# Patient Record
Sex: MALE | Race: WHITE | Employment: FULL TIME | URBAN - METROPOLITAN AREA
[De-identification: names, ages, dates, MRNs, and addresses within clinical notes are randomized per-mention and may not be internally consistent; named-entity substitution may affect disease eponyms.]

---

## 2022-01-12 ENCOUNTER — HOSPITAL ENCOUNTER (EMERGENCY)
Age: 32
Discharge: LEFT BEFORE DISCHARGE | End: 2022-01-12
Attending: EMERGENCY MEDICINE

## 2022-01-12 ENCOUNTER — APPOINTMENT (OUTPATIENT)
Dept: GENERAL RADIOLOGY | Age: 32
End: 2022-01-12

## 2022-01-12 VITALS
WEIGHT: 200 LBS | SYSTOLIC BLOOD PRESSURE: 155 MMHG | DIASTOLIC BLOOD PRESSURE: 112 MMHG | BODY MASS INDEX: 27.09 KG/M2 | RESPIRATION RATE: 24 BRPM | HEIGHT: 72 IN | OXYGEN SATURATION: 100 % | HEART RATE: 135 BPM

## 2022-01-12 DIAGNOSIS — T40.601A OPIATE OVERDOSE, ACCIDENTAL OR UNINTENTIONAL, INITIAL ENCOUNTER (HCC): Primary | ICD-10-CM

## 2022-01-12 PROCEDURE — 99283 EMERGENCY DEPT VISIT LOW MDM: CPT

## 2022-01-12 PROCEDURE — 6360000002 HC RX W HCPCS: Performed by: EMERGENCY MEDICINE

## 2022-01-12 PROCEDURE — 71045 X-RAY EXAM CHEST 1 VIEW: CPT

## 2022-01-12 PROCEDURE — 96375 TX/PRO/DX INJ NEW DRUG ADDON: CPT

## 2022-01-12 PROCEDURE — 96374 THER/PROPH/DIAG INJ IV PUSH: CPT

## 2022-01-12 PROCEDURE — 2580000003 HC RX 258: Performed by: EMERGENCY MEDICINE

## 2022-01-12 PROCEDURE — 96361 HYDRATE IV INFUSION ADD-ON: CPT

## 2022-01-12 PROCEDURE — 93005 ELECTROCARDIOGRAM TRACING: CPT | Performed by: EMERGENCY MEDICINE

## 2022-01-12 RX ORDER — 0.9 % SODIUM CHLORIDE 0.9 %
1000 INTRAVENOUS SOLUTION INTRAVENOUS ONCE
Status: COMPLETED | OUTPATIENT
Start: 2022-01-12 | End: 2022-01-12

## 2022-01-12 RX ORDER — PROMETHAZINE HYDROCHLORIDE 25 MG/ML
12.5 INJECTION, SOLUTION INTRAMUSCULAR; INTRAVENOUS ONCE
Status: COMPLETED | OUTPATIENT
Start: 2022-01-12 | End: 2022-01-12

## 2022-01-12 RX ORDER — NALOXONE HYDROCHLORIDE 1 MG/ML
2 INJECTION INTRAMUSCULAR; INTRAVENOUS; SUBCUTANEOUS ONCE
Status: COMPLETED | OUTPATIENT
Start: 2022-01-12 | End: 2022-01-12

## 2022-01-12 RX ORDER — DIPHENHYDRAMINE HYDROCHLORIDE 50 MG/ML
25 INJECTION INTRAMUSCULAR; INTRAVENOUS ONCE
Status: COMPLETED | OUTPATIENT
Start: 2022-01-12 | End: 2022-01-12

## 2022-01-12 RX ADMIN — NALOXONE HYDROCHLORIDE 2 MG: 1 INJECTION PARENTERAL at 15:38

## 2022-01-12 RX ADMIN — SODIUM CHLORIDE 1000 ML: 9 INJECTION, SOLUTION INTRAVENOUS at 16:25

## 2022-01-12 RX ADMIN — DIPHENHYDRAMINE HYDROCHLORIDE 25 MG: 50 INJECTION INTRAMUSCULAR; INTRAVENOUS at 15:54

## 2022-01-12 RX ADMIN — PROMETHAZINE HYDROCHLORIDE 12.5 MG: 25 INJECTION INTRAMUSCULAR; INTRAVENOUS at 15:55

## 2022-01-12 NOTE — ED PROVIDER NOTES
Triage Chief Complaint:   Drug Overdose (snorted percocet 30)    [place]:  Felix Akers is a 33 y.o. male that presents after opiate overdose. Patient was brought in by significant other who reports that he woke up and was complaining of breathing difficulty. Patient is very drowsy on arrival and is intermittently falling asleep which does limit history. Patient is able to tell me that he \"snorted Percocet\" patient's girlfriend does report that he takes \"Percocet 30s\". Patient denies any injection drug use. History is limited as above. The overdose was not intentional.      ROS:  Limited as above. No past medical history on file. No past surgical history on file. No family history on file. Social History     Socioeconomic History    Marital status: Not on file     Spouse name: Not on file    Number of children: Not on file    Years of education: Not on file    Highest education level: Not on file   Occupational History    Not on file   Tobacco Use    Smoking status: Not on file    Smokeless tobacco: Not on file   Substance and Sexual Activity    Alcohol use: Not on file    Drug use: Not on file    Sexual activity: Not on file   Other Topics Concern    Not on file   Social History Narrative    Not on file     Social Determinants of Health     Financial Resource Strain:     Difficulty of Paying Living Expenses: Not on file   Food Insecurity:     Worried About Running Out of Food in the Last Year: Not on file    Sonja of Food in the Last Year: Not on file   Transportation Needs:     Lack of Transportation (Medical): Not on file    Lack of Transportation (Non-Medical):  Not on file   Physical Activity:     Days of Exercise per Week: Not on file    Minutes of Exercise per Session: Not on file   Stress:     Feeling of Stress : Not on file   Social Connections:     Frequency of Communication with Friends and Family: Not on file    Frequency of Social Gatherings with Friends and available lab results from this visit (if applicable):  No results found for this visit on 01/12/22. Radiographs (if obtained):  [] The following radiograph was interpreted by myself in the absence of a radiologist:   [] Radiologist's Report Reviewed:  No orders to display         EKG (if obtained): (All EKG's are interpreted by myself in the absence of a cardiologist)  12 lead EKG per my interpretation:  Sinus Tachycardia at 128  Axis is   Normal  QTc is  within an acceptable range  There is no specific T wave changes appreciated. There is no specific ST wave changes appreciated. No STEMI    Prior EKG to compare with was NOT available. Chart review shows recent radiographs:  No results found. MDM:  Patient here for opiate overdose I did evaluate patient initially in triage room and brought him immediately back to resuscitation room. IV is established and IV Narcan is given with brisk improvement of mental status. Patient did become tachycardic after Narcan administration and IV fluid bolus was given. Phenergan was given for patient subsequent nausea in addition to IV Benadryl for his agitation. EKG also obtained and is with a sinus tachycardia as above. Patient is observed for over an hour and not requiring any further Narcan. Patient on repeated rechecks is requesting discharge home. Patient is adamantly declining that this was an attempt to hurt himself. Patient significant other is able to come get him. I did prescribe patient Narcan for home-going. Clinical Impression:  1. Opiate overdose, accidental or unintentional, initial encounter [facility])      Disposition referral (if applicable):  No follow-up provider specified.   Disposition medications (if applicable):  New Prescriptions    No medications on file       Comment: Please note this report has been produced using speech recognition software and may contain errors related to that system including errors in grammar, punctuation, and spelling, as well as words and phrases that may be inappropriate. If there are any questions or concerns please feel free to contact the dictating provider for clarification.        Kelvin Gould MD  01/13/22 [id number]

## 2022-01-12 NOTE — ED NOTES
The patient ambulated to the bathroom with assistance.                              Adelina Kelly RN  01/12/22 3432

## 2022-01-12 NOTE — ED NOTES
The patient walked in the hallway without any assistance.                     Madeline Garcia RN  01/12/22 9048

## 2022-01-12 NOTE — ED TRIAGE NOTES
Pt arrives to ER with gf states he is having trouble breathing, pt taken to triage and vital signs assessed and pt has pinpoint pupils and is unable to stay awake, pt finally admits to snorting percocet-30 about an hour PTA  Dr David Chan called to triage and pt brought to Trauma 4 IV est and narcan given as ordered

## 2022-01-12 NOTE — ED NOTES
Pt ambulated to bathroom, calling his girlfriend for a ride at this time     Agustina Gay RN  01/12/22 0885

## 2022-01-14 LAB
EKG ATRIAL RATE: 128 BPM
EKG DIAGNOSIS: NORMAL
EKG P AXIS: 51 DEGREES
EKG P-R INTERVAL: 132 MS
EKG Q-T INTERVAL: 308 MS
EKG QRS DURATION: 76 MS
EKG QTC CALCULATION (BAZETT): 449 MS
EKG R AXIS: 21 DEGREES
EKG T AXIS: 25 DEGREES
EKG VENTRICULAR RATE: 128 BPM

## 2022-01-14 PROCEDURE — 93010 ELECTROCARDIOGRAM REPORT: CPT | Performed by: INTERNAL MEDICINE

## 2022-03-16 ENCOUNTER — HOSPITAL ENCOUNTER (EMERGENCY)
Age: 32
Discharge: HOME | End: 2022-03-16
Attending: EMERGENCY MEDICINE

## 2022-03-16 VITALS
HEART RATE: 85 BPM | BODY MASS INDEX: 29.66 KG/M2 | TEMPERATURE: 99 F | DIASTOLIC BLOOD PRESSURE: 98 MMHG | WEIGHT: 219 LBS | SYSTOLIC BLOOD PRESSURE: 134 MMHG | OXYGEN SATURATION: 98 % | HEIGHT: 72 IN | RESPIRATION RATE: 16 BRPM

## 2022-03-16 DIAGNOSIS — Z00.8 ENCOUNTER FOR WORK CAPABILITY ASSESSMENT: ICD-10-CM

## 2022-03-16 DIAGNOSIS — H57.89 EYE IRRITATION: Primary | ICD-10-CM

## 2022-03-16 PROCEDURE — 99283 EMERGENCY DEPT VISIT LOW MDM: CPT

## 2022-03-16 NOTE — ED TRIAGE NOTES
Patient ambulates to triage with complaints of Facial Swelling. Patient states he started having facial swelling last night. Patient states he thinks this may be related to pet allergies. Patient states the swelling is worse today but his work wants him checked out to make sure he doesn't have pink eye before coming to work today.

## 2022-03-16 NOTE — Clinical Note
Baron Moseley was seen and treated in our emergency department on 3/16/2022. He may return to work on 03/16/2022. Patient does not have contagious pinkeye. Cleared to return to work. If you have any questions or concerns, please don't hesitate to call.       Javier Michaels MD

## 2022-03-16 NOTE — ED PROVIDER NOTES
0.50     Types: Cigarettes    Smokeless tobacco: Never Used   Substance and Sexual Activity    Alcohol use: Never    Drug use: Never    Sexual activity: None   Other Topics Concern    None   Social History Narrative    None     Social Determinants of Health     Financial Resource Strain:     Difficulty of Paying Living Expenses: Not on file   Food Insecurity:     Worried About Running Out of Food in the Last Year: Not on file    Sonja of Food in the Last Year: Not on file   Transportation Needs:     Lack of Transportation (Medical): Not on file    Lack of Transportation (Non-Medical): Not on file   Physical Activity:     Days of Exercise per Week: Not on file    Minutes of Exercise per Session: Not on file   Stress:     Feeling of Stress : Not on file   Social Connections:     Frequency of Communication with Friends and Family: Not on file    Frequency of Social Gatherings with Friends and Family: Not on file    Attends Shinto Services: Not on file    Active Member of [address] [place] [address] or Organizations: Not on file    Attends Club or Organization Meetings: Not on file    Marital Status: Not on file   Intimate Partner Violence:     Fear of Current or Ex-Partner: Not on file    Emotionally Abused: Not on file    Physically Abused: Not on file    Sexually Abused: Not on file   Housing Stability:     Unable to Pay for Housing in the Last Year: Not on file    Number of [place] in the Last Year: Not on file    Unstable Housing in the Last Year: Not on file       SURGICAL HISTORY  History reviewed. No pertinent surgical history. CURRENT MEDICATIONS  No current facility-administered medications on file prior to encounter. No current outpatient medications on file prior to encounter.          ALLERGIES  Allergies   Allergen Reactions    Benadryl [Diphenhydramine] Hives       PHYSICAL EXAM  VITAL SIGNS: BP (!) 134/98   Pulse 85   Temp 99 °F (37.2 °C) (Infrared)   Resp 16   Ht 6' (1.829 m) Wt 219 lb (99.3 kg)   SpO2 98%   BMI 29.70 kg/m²   Constitutional: Well developed, Well nourished, resting in bed, active, pleasant  HENT: Normocephalic, Atraumatic, Bilateral external ears normal, Oropharynx moist, No oral exudates, Nose normal.  No perioral or periorbital edema  Eyes: PERRL, EOMI, Conjunctiva normal, No discharge. Pupils 3 mm and reactive  Neck: Normal range of motion, Supple, No stridor. Cardiovascular: Normal heart rate, Normal rhythm, No murmurs, No rubs, No gallops. Thorax & Lungs: Normal breath sounds, No respiratory distress, No wheezing, No chest tenderness. Abdomen: Bowel sounds normal, Soft, No tenderness, no guarding, no rebound, No masses, No pulsatile masses. Skin: Warm, Dry, No erythema, No rash. Extremities: Intact distal pulses, No edema, No tenderness, No cyanosis, No clubbing. Musculoskeletal: Good gross range of motion in all major joints. No major deformities noted. Neurologic: Alert & oriented x 3, Normal gross motor function, Normal gross sensory function, No focal deficits noted. Psychiatric: Affect normal      COURSE & MEDICAL DECISION MAKING  Pertinent Labs & Imaging studies reviewed. (See chart for details)    38-year-old male presents after eye irritation yesterday. Its resolved by time of arrival here, there is no suggestion of conjunctival injection or periorbital edema. There is no signs of allergic reaction. He is well, active, hemodynamically stable without any concerns. He is cleared to return to work, there is no suggestion of bacterial conjunctivitis on clinical exam.  Discharged in stable condition, strict return precautions put into place. Work note provided.     FINAL IMPRESSION  Problem List Items Addressed This Visit     None      Visit Diagnoses     Eye irritation    -  Primary    Encounter for work capability assessment          1.    2.   3.    Patient gave me permission to discuss medical history, care, and plan with those present in the room.   Electronically signed by: Benja Lira MD, 3/16/2022  MD Benja Edward MD  03/16/22 [id number]

## 2022-06-02 ENCOUNTER — HOSPITAL ENCOUNTER (EMERGENCY)
Age: 32
Discharge: HOME | End: 2022-06-02
Attending: STUDENT IN AN ORGANIZED HEALTH CARE EDUCATION/TRAINING PROGRAM

## 2022-06-02 VITALS
HEART RATE: 82 BPM | TEMPERATURE: 97.3 F | SYSTOLIC BLOOD PRESSURE: 137 MMHG | HEIGHT: 72 IN | BODY MASS INDEX: 29.12 KG/M2 | RESPIRATION RATE: 14 BRPM | DIASTOLIC BLOOD PRESSURE: 100 MMHG | OXYGEN SATURATION: 96 % | WEIGHT: 215 LBS

## 2022-06-02 DIAGNOSIS — J01.90 ACUTE NON-RECURRENT SINUSITIS, UNSPECIFIED LOCATION: Primary | ICD-10-CM

## 2022-06-02 DIAGNOSIS — R05.9 COUGH: ICD-10-CM

## 2022-06-02 PROCEDURE — 99283 EMERGENCY DEPT VISIT LOW MDM: CPT

## 2022-06-02 RX ORDER — CETIRIZINE HYDROCHLORIDE 10 MG/1
10 TABLET ORAL DAILY
Qty: 30 TABLET | Refills: 0 | Status: SHIPPED | OUTPATIENT
Start: 2022-06-02 | End: 2022-07-02

## 2022-06-02 RX ORDER — CETIRIZINE HYDROCHLORIDE 10 MG/1
10 TABLET ORAL DAILY
Qty: 30 TABLET | Refills: 0 | Status: SHIPPED | OUTPATIENT
Start: 2022-06-02 | End: 2022-06-02 | Stop reason: SDUPTHER

## 2022-06-02 RX ORDER — FLUTICASONE PROPIONATE 50 MCG
2 SPRAY, SUSPENSION (ML) NASAL DAILY
Qty: 16 G | Refills: 0 | Status: SHIPPED | OUTPATIENT
Start: 2022-06-02

## 2022-06-02 RX ORDER — FLUTICASONE PROPIONATE 50 MCG
2 SPRAY, SUSPENSION (ML) NASAL DAILY
Qty: 16 G | Refills: 0 | Status: SHIPPED | OUTPATIENT
Start: 2022-06-02 | End: 2022-06-02 | Stop reason: SDUPTHER

## 2022-06-02 ASSESSMENT — PAIN - FUNCTIONAL ASSESSMENT: PAIN_FUNCTIONAL_ASSESSMENT: NONE - DENIES PAIN

## 2022-06-02 NOTE — Clinical Note
Hoang Duval was seen and treated in our emergency department on 6/2/2022. He may return to work on 06/03/2022. If you have any questions or concerns, please don't hesitate to call.       Ryder Simms MD

## 2022-06-02 NOTE — ED PROVIDER NOTES
[facility] ENCOUNTER      Pt Name: Rai Veronica  MRN: [id number]  [place] 1990  Date of evaluation: 6/2/2022  Provider: Jamison Baez MD    CHIEF COMPLAINT       Chief Complaint   Patient presents with    Nasal Congestion     1 month. reports concerned for black mold. reports needing a work note today. reports left nostril sore from putting a q tip in it       HISTORY OF PRESENT ILLNESS    Rai Veronica is a 33 y.o. male Presenting with nasal congestion and cough. Symptoms been worsening over the past few days. He was coughing so much yesterday that he had 1 episode of posttussive emesis. He describes concern of having black mold as he had a neighbor that moved out due to concerns of black mold. Not confirm whether there is black mold in his apartment building or not. He states that his symptoms feel better when he walks out of his apartment. He has been taking Mucinex without improvement of his symptoms. No fevers, chills, nausea, vomiting, rash, hemoptysis, leg pain or swelling, chest pain reported. Nursing Notes were reviewed. REVIEW OF SYSTEMS     Review of Systems  A 10 point review of system was performed and is otherwise negative apart from what is noted in HPI and nursing notes. As is my standard practice, all pertinent positives from the ROS are documented in the HPI. PAST MEDICAL HISTORY   History reviewed. No pertinent past medical history. SURGICAL HISTORY     History reviewed. No pertinent surgical history. CURRENT MEDICATIONS       Discharge Medication List as of 6/2/2022  3:31 PM          ALLERGIES     Benadryl [diphenhydramine]    FAMILY HISTORY     History reviewed. No pertinent family history.      SOCIAL HISTORY       Social History     Socioeconomic History    Marital status: Single     Spouse name: None    Number of children: None    Years of education: None    Highest education level: None   Occupational History    None   Tobacco Use    Smoking status: Current Every Day Smoker     Packs/day: 0.50     Types: Cigarettes    Smokeless tobacco: Never Used   Substance and Sexual Activity    Alcohol use: Never    Drug use: Never    Sexual activity: None   Other Topics Concern    None   Social History Narrative    None     Social Determinants of Health     Financial Resource Strain:     Difficulty of Paying Living Expenses: Not on file   Food Insecurity:     Worried About Running Out of Food in the Last Year: Not on file    Sonja of Food in the Last Year: Not on file   Transportation Needs:     Lack of Transportation (Medical): Not on file    Lack of Transportation (Non-Medical): Not on file   Physical Activity:     Days of Exercise per Week: Not on file    Minutes of Exercise per Session: Not on file   Stress:     Feeling of Stress : Not on file   Social Connections:     Frequency of Communication with Friends and Family: Not on file    Frequency of Social Gatherings with Friends and Family: Not on file    Attends Temple Services: Not on file    Active Member of [address] [place] [address] or Organizations: Not on file    Attends Club or Organization Meetings: Not on file    Marital Status: Not on file   Intimate Partner Violence:     Fear of Current or Ex-Partner: Not on file    Emotionally Abused: Not on file    Physically Abused: Not on file    Sexually Abused: Not on file   Housing Stability:     Unable to Pay for Housing in the Last Year: Not on file    Number of [place] in the Last Year: Not on file    Unstable Housing in the Last Year: Not on file       PHYSICAL EXAM       ED Triage Vitals   BP Temp Temp src Heart Rate Resp SpO2 Height Weight   06/02/22 1420 06/02/22 1421 -- 06/02/22 1420 06/02/22 1420 06/02/22 1420 06/02/22 1420 06/02/22 1420   (!) 137/100 97.3 °F (36.3 °C)  82 14 96 % 6' (1.829 m) 215 lb (97.5 kg)         Physical Exam  Vitals and nursing note reviewed.    Constitutional: Appearance: Normal appearance. HENT:      Head: Normocephalic. Comments: No significant tenderness to the frontal, ethmoid, maxillary sinuses     Right Ear: Tympanic membrane normal.      Left Ear: Tympanic membrane normal.      Nose: Nose normal. No congestion. Eyes:      Extraocular Movements: Extraocular movements intact. Conjunctiva/sclera: Conjunctivae normal.      Pupils: Pupils are equal, round, and reactive to light. Cardiovascular:      Rate and Rhythm: Normal rate and regular rhythm. Pulmonary:      Effort: Pulmonary effort is normal. No respiratory distress. Abdominal:      General: There is no distension. Palpations: Abdomen is soft. Tenderness: There is no abdominal tenderness. Musculoskeletal:         General: Normal range of motion. Cervical back: Normal range of motion. Skin:     General: Skin is warm and dry. Neurological:      General: No focal deficit present. Mental Status: He is alert and oriented to person, place, and time. Psychiatric:         Mood and Affect: Mood normal.         Behavior: Behavior normal.         DIAGNOSTIC RESULTS     EKG: All EKG's are interpreted by me in the absence of a cardiologist.      RADIOLOGY:   Interpretation per the Radiologist below, if available at the time of this note:    No orders to display       LABS:  Labs Reviewed - No data to display    All other labs were within normal range or not returned as of this dictation. EMERGENCY DEPARTMENT COURSE        DIFFERENTIAL DIAGNOSIS/MDM:   Vitals:    Vitals:    06/02/22 1420 06/02/22 1421   BP: (!) 137/100    Pulse: 82    Resp: 14    Temp:  97.3 °F (36.3 °C)   SpO2: 96%    Weight: 215 lb (97.5 kg)    Height: 6' (1.829 m)        MDM  Number of Diagnoses or Management Options  Acute non-recurrent sinusitis, unspecified location  Cough  Diagnosis management comments: 70-year-old male presenting for 3 days of worsening nasal congestion and cough.   Is concerned for black mold. I instructed him to follow-up with public health officials or his landlord if he has concern for black mold. Otherwise vitals within normal limits. Patient appears well. No signs of serious bacterial illness such as bacterial sinusitis, meningitis, otitis media, strep throat, pneumonia. Symptoms are most consistent with seasonal allergies. Instructed him in over-the-counter medications such as Flonase, Sudafed, Zyrtec, Mucinex. CONSULTS:  None    PROCEDURES:  Unless otherwise noted below, none. Procedures      FINAL IMPRESSION      1. Acute non-recurrent sinusitis, unspecified location    2. Cough          PATIENT REFERRED TO:  No follow-up provider specified. DISCHARGE MEDICATIONS:  Discharge Medication List as of 6/2/2022  3:31 PM        Controlled Substances Monitoring:     No flowsheet data found.     (Please note that portions of this note were completed with a voice recognition program.  Efforts were made to edit the dictations but occasionally words are mis-transcribed.)    Judson Sigala MD (electronically signed)  Attending Emergency Physician            Judson Sigala MD  06/02/22 0606

## 2023-08-09 ENCOUNTER — HOSPITAL ENCOUNTER (EMERGENCY)
Age: 33
Discharge: HOME | End: 2023-08-09
Attending: EMERGENCY MEDICINE
Payer: COMMERCIAL

## 2023-08-09 VITALS
SYSTOLIC BLOOD PRESSURE: 148 MMHG | TEMPERATURE: 98.8 F | BODY MASS INDEX: 29.93 KG/M2 | DIASTOLIC BLOOD PRESSURE: 117 MMHG | RESPIRATION RATE: 16 BRPM | WEIGHT: 221 LBS | HEIGHT: 72 IN | OXYGEN SATURATION: 96 % | HEART RATE: 89 BPM

## 2023-08-09 DIAGNOSIS — R19.7 ACUTE DIARRHEA: Primary | ICD-10-CM

## 2023-08-09 DIAGNOSIS — Z72.0 TOBACCO USE: ICD-10-CM

## 2023-08-09 PROCEDURE — 99283 EMERGENCY DEPT VISIT LOW MDM: CPT

## 2023-08-09 RX ORDER — ALBUTEROL SULFATE 90 UG/1
2 AEROSOL, METERED RESPIRATORY (INHALATION) 4 TIMES DAILY PRN
Qty: 18 G | Refills: 0 | Status: SHIPPED | OUTPATIENT
Start: 2023-08-09

## 2023-08-09 ASSESSMENT — PAIN - FUNCTIONAL ASSESSMENT: PAIN_FUNCTIONAL_ASSESSMENT: NONE - DENIES PAIN

## 2023-08-09 NOTE — ED PROVIDER NOTES
Emergency Department Encounter  Location: [address] [place] [address]    Patient: Barbara Mejia  MRN: [id number]  : 1990  Date of evaluation: 2023  ED Provider: Freda Martinez DO    Chief Complaint:    Nausea, Emesis, and Diarrhea (Pt started having symptoms yesterday after taking magnesium citrate. Pt feeling better today and is asking about a work note d/t not feeling well yesterday.)    [place]:  Barbara Mejia is a 27 y.o. male that presents to the emergency department with request for work note. Patient states that yesterday he was feeling a little constipated and had not had a bowel movement for a day and a half. He took mag citrate from his mother and then developed diarrhea. He describes that, with straining to have a bowel movement, he developed nausea and vomiting. Not associated with fever, chills or any symptoms. Denies abdominal pain. Able to eat and drink today. States that all symptoms are resolved today but he does a note work note to go back to work. Past Medical History:   Diagnosis Date    Hypertension      Past Surgical History:   Procedure Laterality Date    HERNIA REPAIR       History reviewed. No pertinent family history.   Social History     Socioeconomic History    Marital status: Single     Spouse name: Not on file    Number of children: Not on file    Years of education: Not on file    Highest education level: Not on file   Occupational History    Not on file   Tobacco Use    Smoking status: Every Day     Packs/day: 1.00     Types: Cigarettes    Smokeless tobacco: Never   Substance and Sexual Activity    Alcohol use: Never    Drug use: Never    Sexual activity: Not on file   Other Topics Concern    Not on file   Social History Narrative    Not on file     Social Determinants of Health     Financial Resource Strain: Not on file   Food Insecurity: Not on file   Transportation Needs: Not on file   Physical Activity: Not on file   Stress: Not on file   Social

## 2023-08-09 NOTE — ED NOTES
Discharge instructions given, pt informed prescription was sent to pharmacy. Pt voiced understanding. Ambulatory to exit without incident.       Joao Monahan RN  08/09/23 2434

## 2025-07-09 ENCOUNTER — HOSPITAL ENCOUNTER (EMERGENCY)
Age: 35
Discharge: HOME | End: 2025-07-09
Attending: EMERGENCY MEDICINE

## 2025-07-09 VITALS
SYSTOLIC BLOOD PRESSURE: 145 MMHG | HEART RATE: 90 BPM | BODY MASS INDEX: 26.69 KG/M2 | HEIGHT: 74 IN | DIASTOLIC BLOOD PRESSURE: 104 MMHG | RESPIRATION RATE: 15 BRPM | TEMPERATURE: 98.1 F | OXYGEN SATURATION: 98 % | WEIGHT: 208 LBS

## 2025-07-09 DIAGNOSIS — H57.89 EYE IRRITATION: Primary | ICD-10-CM

## 2025-07-09 PROCEDURE — 99282 EMERGENCY DEPT VISIT SF MDM: CPT

## 2025-07-09 ASSESSMENT — LIFESTYLE VARIABLES
HOW OFTEN DO YOU HAVE A DRINK CONTAINING ALCOHOL: NEVER
HOW MANY STANDARD DRINKS CONTAINING ALCOHOL DO YOU HAVE ON A TYPICAL DAY: PATIENT DOES NOT DRINK

## 2025-07-09 ASSESSMENT — PAIN - FUNCTIONAL ASSESSMENT: PAIN_FUNCTIONAL_ASSESSMENT: NONE - DENIES PAIN

## 2025-07-09 ASSESSMENT — ENCOUNTER SYMPTOMS
ALLERGIC/IMMUNOLOGIC NEGATIVE: 1
EYE PAIN: 0
EYE DISCHARGE: 0
EYE REDNESS: 0
RESPIRATORY NEGATIVE: 1
EYE ITCHING: 1
GASTROINTESTINAL NEGATIVE: 1

## 2025-07-09 NOTE — ED PROVIDER NOTES
[facility]      TRIAGE CHIEF COMPLAINT:   Eye Problem (Red irritated) and Urinary Frequency (buring)      [place]:  Juanito Bryan is a 34 y.o. male that presents with a complaint of right eye itching.  Patient states he is a  he has been around people with pinkeye he was told to come in to get checked for pinkeye as he could be contagious.  Patient states he does not feel like he has pinkeye he has had a before he is complaining of eye itching on the right side he complains of allergy symptoms, sneezing, itching.  He does take Visine drops.  He states he has been like this for the last day or 2.  He has no fevers nausea vomiting cough chest pain shortness of breath headache, no trauma, he does not wear contacts or glasses he states he did not scratch his eye he has had some slight blurry vision when he looks to the right he contributes to eyestrain from looking at screens all day long, he states he needs to see a eye doctor.  He feels like he is fine he denies any other question concerns, mainly came in for a work note that he can go back to work, he also states that he had some cloudy urine no dysuria no hematuria no fevers nausea vomiting abdominal pain denies any discharge or STDs.  No other questions or concerns.    REVIEW OF SYSTEMS:  At least 10 systems reviewed and otherwise acutely negative except as in the [place].    Review of Systems   Constitutional: Negative.    HENT:  Positive for sneezing.    Eyes:  Positive for itching and visual disturbance. Negative for pain, discharge and redness.   Respiratory: Negative.     Cardiovascular: Negative.    Gastrointestinal: Negative.    Endocrine: Negative.    Genitourinary:  Negative for frequency.        Cloudy urine   Musculoskeletal: Negative.    Skin: Negative.    Allergic/Immunologic: Negative.    Neurological: Negative.    Hematological: Negative.    Psychiatric/Behavioral: Negative.     All other systems reviewed and are